# Patient Record
Sex: FEMALE | Race: WHITE | Employment: UNEMPLOYED | URBAN - METROPOLITAN AREA
[De-identification: names, ages, dates, MRNs, and addresses within clinical notes are randomized per-mention and may not be internally consistent; named-entity substitution may affect disease eponyms.]

---

## 2020-05-18 ENCOUNTER — TELEPHONE (OUTPATIENT)
Dept: ENDOCRINOLOGY CLINIC | Facility: CLINIC | Age: 40
End: 2020-05-18

## 2020-05-18 NOTE — TELEPHONE ENCOUNTER
Patient indicates she received a card from [employer] to schedule an appointment regarding her thyroid. Patient is a new consult and asking if she can make an appointment. Please call with  at:[phone],thanks.

## 2020-08-10 ENCOUNTER — OFFICE VISIT (OUTPATIENT)
Dept: ENDOCRINOLOGY CLINIC | Facility: CLINIC | Age: 40
End: 2020-08-10
Payer: COMMERCIAL

## 2020-08-10 VITALS
DIASTOLIC BLOOD PRESSURE: 70 MMHG | HEART RATE: 66 BPM | WEIGHT: 128 LBS | BODY MASS INDEX: 24 KG/M2 | SYSTOLIC BLOOD PRESSURE: 101 MMHG

## 2020-08-10 DIAGNOSIS — E04.2 MULTIPLE THYROID NODULES: Primary | ICD-10-CM

## 2020-08-10 PROCEDURE — 3078F DIAST BP <80 MM HG: CPT | Performed by: INTERNAL MEDICINE

## 2020-08-10 PROCEDURE — 3074F SYST BP LT 130 MM HG: CPT | Performed by: INTERNAL MEDICINE

## 2020-08-10 PROCEDURE — 99203 OFFICE O/P NEW LOW 30 MIN: CPT | Performed by: INTERNAL MEDICINE

## 2020-08-10 NOTE — PROGRESS NOTES
Name: Adam Pardo  Date: 8/10/2020    Referring Physician: No ref. provider found    Patient presents with:  Consult: PCP advised pt to follow up with Endocrinologist due to abnormal thyroid US.        HISTORY OF PRESENT ILLNESS   Delon Liao file    Tobacco Use      Smoking status: Never Smoker      Smokeless tobacco: Never Used      Medical History:   History reviewed. No pertinent past medical history. Surgical history:   History reviewed. No pertinent surgical history.     PHYSICAL EXAMIN

## 2023-12-29 NOTE — PAT NURSING NOTE
Patient expressed some concerns regarding her sedation for her upcoming EGD. Instructed patient to call [facility] office; the office phone number was provided.

## 2024-01-08 ENCOUNTER — ANESTHESIA EVENT (OUTPATIENT)
Dept: ENDOSCOPY | Facility: HOSPITAL | Age: 44
End: 2024-01-08
Payer: COMMERCIAL

## 2024-01-08 ENCOUNTER — ANESTHESIA (OUTPATIENT)
Dept: ENDOSCOPY | Facility: HOSPITAL | Age: 44
End: 2024-01-08
Payer: COMMERCIAL

## 2024-01-08 ENCOUNTER — HOSPITAL ENCOUNTER (OUTPATIENT)
Facility: HOSPITAL | Age: 44
Setting detail: HOSPITAL OUTPATIENT SURGERY
Discharge: HOME | End: 2024-01-08
Attending: INTERNAL MEDICINE | Admitting: INTERNAL MEDICINE
Payer: COMMERCIAL

## 2024-01-08 VITALS
WEIGHT: 125 LBS | RESPIRATION RATE: 15 BRPM | OXYGEN SATURATION: 100 % | SYSTOLIC BLOOD PRESSURE: 111 MMHG | DIASTOLIC BLOOD PRESSURE: 75 MMHG | HEIGHT: 61 IN | HEART RATE: 68 BPM | BODY MASS INDEX: 23.6 KG/M2

## 2024-01-08 DIAGNOSIS — R10.13 EPIGASTRIC PAIN: ICD-10-CM

## 2024-01-08 LAB — B-HCG UR QL: NEGATIVE

## 2024-01-08 PROCEDURE — 0DB68ZX EXCISION OF STOMACH, VIA NATURAL OR ARTIFICIAL OPENING ENDOSCOPIC, DIAGNOSTIC: ICD-10-PCS | Performed by: INTERNAL MEDICINE

## 2024-01-08 PROCEDURE — 0DB98ZX EXCISION OF DUODENUM, VIA NATURAL OR ARTIFICIAL OPENING ENDOSCOPIC, DIAGNOSTIC: ICD-10-PCS | Performed by: INTERNAL MEDICINE

## 2024-01-08 PROCEDURE — 81025 URINE PREGNANCY TEST: CPT

## 2024-01-08 PROCEDURE — 88305 TISSUE EXAM BY PATHOLOGIST: CPT | Performed by: INTERNAL MEDICINE

## 2024-01-08 PROCEDURE — 88312 SPECIAL STAINS GROUP 1: CPT | Performed by: INTERNAL MEDICINE

## 2024-01-08 RX ORDER — SODIUM CHLORIDE, SODIUM LACTATE, POTASSIUM CHLORIDE, CALCIUM CHLORIDE 600; 310; 30; 20 MG/100ML; MG/100ML; MG/100ML; MG/100ML
INJECTION, SOLUTION INTRAVENOUS CONTINUOUS
Status: DISCONTINUED | OUTPATIENT
Start: 2024-01-08 | End: 2024-01-08

## 2024-01-08 RX ORDER — MIDAZOLAM HYDROCHLORIDE 1 MG/ML
INJECTION INTRAMUSCULAR; INTRAVENOUS AS NEEDED
Status: DISCONTINUED | OUTPATIENT
Start: 2024-01-08 | End: 2024-01-08 | Stop reason: SURG

## 2024-01-08 RX ORDER — MIDAZOLAM HYDROCHLORIDE 1 MG/ML
1 INJECTION INTRAMUSCULAR; INTRAVENOUS EVERY 5 MIN PRN
Status: DISCONTINUED | OUTPATIENT
Start: 2024-01-08 | End: 2024-01-08

## 2024-01-08 RX ORDER — DEXAMETHASONE SODIUM PHOSPHATE 4 MG/ML
VIAL (ML) INJECTION AS NEEDED
Status: DISCONTINUED | OUTPATIENT
Start: 2024-01-08 | End: 2024-01-08 | Stop reason: SURG

## 2024-01-08 RX ORDER — NALOXONE HYDROCHLORIDE 0.4 MG/ML
0.08 INJECTION, SOLUTION INTRAMUSCULAR; INTRAVENOUS; SUBCUTANEOUS ONCE AS NEEDED
Status: DISCONTINUED | OUTPATIENT
Start: 2024-01-08 | End: 2024-01-08

## 2024-01-08 RX ORDER — LIDOCAINE HYDROCHLORIDE 10 MG/ML
INJECTION, SOLUTION EPIDURAL; INFILTRATION; INTRACAUDAL; PERINEURAL AS NEEDED
Status: DISCONTINUED | OUTPATIENT
Start: 2024-01-08 | End: 2024-01-08 | Stop reason: SURG

## 2024-01-08 RX ORDER — SODIUM CHLORIDE 0.9 % (FLUSH) 0.9 %
10 SYRINGE (ML) INJECTION AS NEEDED
Status: DISCONTINUED | OUTPATIENT
Start: 2024-01-08 | End: 2024-01-08

## 2024-01-08 RX ORDER — ONDANSETRON 2 MG/ML
INJECTION INTRAMUSCULAR; INTRAVENOUS AS NEEDED
Status: DISCONTINUED | OUTPATIENT
Start: 2024-01-08 | End: 2024-01-08 | Stop reason: SURG

## 2024-01-08 RX ORDER — METOCLOPRAMIDE HYDROCHLORIDE 5 MG/ML
INJECTION INTRAMUSCULAR; INTRAVENOUS AS NEEDED
Status: DISCONTINUED | OUTPATIENT
Start: 2024-01-08 | End: 2024-01-08 | Stop reason: SURG

## 2024-01-08 RX ADMIN — SODIUM CHLORIDE, SODIUM LACTATE, POTASSIUM CHLORIDE, CALCIUM CHLORIDE: 600; 310; 30; 20 INJECTION, SOLUTION INTRAVENOUS at 10:37:00

## 2024-01-08 RX ADMIN — MIDAZOLAM HYDROCHLORIDE 2 MG: 1 INJECTION INTRAMUSCULAR; INTRAVENOUS at 10:22:00

## 2024-01-08 RX ADMIN — METOCLOPRAMIDE HYDROCHLORIDE 20 MG: 5 INJECTION INTRAMUSCULAR; INTRAVENOUS at 10:22:00

## 2024-01-08 RX ADMIN — LIDOCAINE HYDROCHLORIDE 50 MG: 10 INJECTION, SOLUTION EPIDURAL; INFILTRATION; INTRACAUDAL; PERINEURAL at 10:24:00

## 2024-01-08 RX ADMIN — ONDANSETRON 4 MG: 2 INJECTION INTRAMUSCULAR; INTRAVENOUS at 10:22:00

## 2024-01-08 RX ADMIN — SODIUM CHLORIDE, SODIUM LACTATE, POTASSIUM CHLORIDE, CALCIUM CHLORIDE: 600; 310; 30; 20 INJECTION, SOLUTION INTRAVENOUS at 10:22:00

## 2024-01-08 RX ADMIN — DEXAMETHASONE SODIUM PHOSPHATE 4 MG: 4 MG/ML VIAL (ML) INJECTION at 10:29:00

## 2024-01-08 RX ADMIN — DEXAMETHASONE SODIUM PHOSPHATE 4 MG: 4 MG/ML VIAL (ML) INJECTION at 10:25:00

## 2024-01-08 RX ADMIN — LIDOCAINE HYDROCHLORIDE 50 MG: 10 INJECTION, SOLUTION EPIDURAL; INFILTRATION; INTRACAUDAL; PERINEURAL at 10:23:00

## 2024-01-08 NOTE — ANESTHESIA PREPROCEDURE EVALUATION
Anesthesia PreOp Note    HPI:     Eleni Linder is a 43 year old female who presents for preoperative consultation requested by: Jessi Dukes MD    Date of Surgery: 2024    Procedure(s):  ESOPHAGOGASTRODUODENOSCOPY  Indication: Epigastric pain    Relevant Problems   No relevant active problems       NPO:  Last Liquid Consumption Date: 24  Last Liquid Consumption Time: 2330  Last Solid Consumption Date: 24  Last Solid Consumption Time: 1900  Last Liquid Consumption Date: 24          History Review:  Patient Active Problem List    Diagnosis Date Noted    Multiple thyroid nodules 08/10/2020       Past Medical History:   Diagnosis Date    History of 2019 novel coronavirus disease (COVID-19)     20 - not hospitalized     Vitamin D deficiency        Past Surgical History:   Procedure Laterality Date             Medications Prior to Admission   Medication Sig Dispense Refill Last Dose    Cholecalciferol 50 MCG (2000) Oral Tab Take 2,000 Units by mouth daily. (Patient not taking: Reported on 2021 )       Pantoprazole Sodium 40 MG Oral Tab EC Take 1 tablet (40 mg total) by mouth daily. Before meal (Patient not taking: Reported on 8/10/2020 ) 60 tablet 0      Current Facility-Administered Medications Ordered in Epic   Medication Dose Route Frequency Provider Last Rate Last Admin    sodium chloride 0.9% 0.9% flush injection 10 mL  10 mL Intravenous PRN Jessi Dukes MD        lactated ringers infusion   Intravenous Continuous Jessi Dukes MD        midazolam (Versed) 2 MG/2ML injection 1 mg  1 mg Intravenous Q5 Min PRN Jessi Dukes MD        fentaNYL (Sublimaze) 50 mcg/mL injection 25 mcg  25 mcg Intravenous Q5 Min PRN Jessi Dukes MD        lactated ringers infusion   Intravenous Continuous Jessi Dukes MD        naloxone (Narcan) 0.4 MG/ML injection 0.08 mg  0.08 mg Intravenous Once PRN Jessi Dukes MD         No current [facility]-ordered outpatient medications on file.        No Known Allergies    Family History   Family history unknown: Yes     Social History     Socioeconomic History    Marital status: Unknown   Tobacco Use    Smoking status: Never    Smokeless tobacco: Never   Vaping Use    Vaping Use: Never used   Substance and Sexual Activity    Alcohol use: Not Currently    Drug use: Not Currently       Available pre-op labs reviewed.  Lab Results   Component Value Date    URINEPREG Negative 01/08/2024             Vital Signs:  Body mass index is 23.62 kg/m².   height is 1.549 m (5' 1\") and weight is 56.7 kg (125 lb). Her blood pressure is 112/69 and her pulse is 69. Her respiration is 14 and oxygen saturation is 100%.   Vitals:    12/29/23 1606 01/08/24 0929   BP:  112/69   Pulse:  69   Resp:  14   SpO2:  100%   Weight: 56.7 kg (125 lb)    Height: 1.549 m (5' 1\")         Anesthesia Evaluation     Patient summary reviewed and Nursing notes reviewed    Airway   Mallampati: I  TM distance: >3 FB  Neck ROM: full  Dental      Pulmonary - negative ROS    breath sounds clear to auscultation  Cardiovascular - negative ROS  Exercise tolerance: good    Rhythm: regular  Rate: normal    Neuro/Psych - negative ROS     GI/Hepatic/Renal      Comments: Epigastric pain    Endo/Other - negative ROS   Abdominal      Other findings: METS > 4            Anesthesia Plan:   ASA:  1  Plan:   MAC  Informed Consent Plan and Risks Discussed With:  Patient      I have informed Elenibharathi Linder and/or legal guardian or family member of the nature of the anesthetic plan, benefits, risks including possible dental damage if relevant, major complications, and any alternative forms of anesthetic management.   All of the patient's questions were answered to the best of my ability. The patient desires the anesthetic management as planned.  Aris Morales CRNA  1/8/2024 10:16 AM  Present on Admission:  **None**

## 2024-01-08 NOTE — ANESTHESIA POSTPROCEDURE EVALUATION
Patient: Elein Linder    Procedure Summary       Date: 01/08/24 Room / Location: [facility] ENDOSCOPY 05 / EM ENDOSCOPY    Anesthesia Start: 1022 Anesthesia Stop:     Procedure: ESOPHAGOGASTRODUODENOSCOPY Diagnosis:       Epigastric pain      (gastric polyp, rule out gastritis, rule out celiac disease)    Surgeons: Jessi Dukes MD Anesthesiologist: Aris Morales CRNA    Anesthesia Type: MAC ASA Status: 1            Anesthesia Type: MAC    Vitals Value Taken Time   /67 01/08/24 1042   Temp 97.0 01/08/24 1042   Pulse 77 01/08/24 1042   Resp 14 01/08/24 1042   SpO2 99 01/08/24 1042       [facility] AN Post Evaluation:   Patient Evaluated in PACU  Patient Participation: complete - patient participated  Level of Consciousness: awake and alert  Pain Score: 0  Pain Management: adequate  Airway Patency:patent  Yes    Cardiovascular Status: acceptable  Respiratory Status: acceptable  Postoperative Hydration acceptable      Aris Morales CRNA  1/8/2024 10:42 AM

## 2024-01-08 NOTE — DISCHARGE INSTRUCTIONS
POST-UPPER ENDOSCOPY (EGD) DISCHARGE INSTRUCTIONS      PROCEDURE PERFORMED: EGD with biopsies    ENDOSCOPIST: Jessi Dukes MD    FINDINGS: Gastritis (inflammation of the stomach lining) and Gastric polyps    MEDICATIONS: You may resume all other medications today    DIET: You may resume your regular diet today.    BIOPSIES: No biopsies were taken    X-RAYS: No X-rays/Labs were ordered today        Activity for remainder of today:  REST TODAY  DO NOT drive or operate heavy machinery  DO NOT drink any alcoholic beverages  DO NOT sign any legal documents or make any important decisions    After your procedure(s):  It is not unusual to feel bloated or gassy .  Passing gas and belching is encouraged. Lying on your left side with your knees flexed may relieve the discomfort. A hot pack to the abdomen may also help. If you had an upper endoscopy (EGD), you may experience a slight sore throat which will subside. Throat lozenges or salt water gargle can be used.    FOLLOW-UP:  Contact the office at [phone] if a follow-up appointment is needed or if you develop any of the following:    Severe abdominal pain/discomfort   Excessive bleeding  Black tarry stool  Difficulty breathing/swallowing  Persistent nausea/vomiting    Fever above 100 degrees or chills  Home Care Instructions for Gastroscopy with Sedation    Diet:  - Resume your regular diet as tolerated unless otherwise instructed.  - Start with light meals to minimize bloating.  - Do not drink alcohol today.    Medication:  - If you have questions about resuming your normal medications, please contact your Primary Care Physician.    Activities:  - Take it easy today. Do not return to work today.  - Do not drive today.  - Do not operate any machinery today (including kitchen equipment).    Gastroscopy:  - You may have a sore throat for 2-3 days following the exam. This is normal. Gargling with warm salt water (1/2 tsp salt to 1 glass warm water) or using throat lozenges  will help.  - If you experience any sharp pain in your neck, abdomen or chest, vomiting of blood, oral temperature over 100 degrees Fahrenheit, light-headedness or dizziness, or any other problems, contact your doctor.    **If unable to reach your doctor, please go to the [facility] Room**    - Your referring physician will receive a full report of your examination.  - If you do not hear from your doctor's office within two weeks of your biopsy, please call them for your results.    You may be able to see your laboratory results in [employer] between 4 and 7 business days.  In some cases, your physician may not have viewed the results before they are released to [employer].  If you have questions regarding your results contact the physician who ordered the test/exam by phone or via [employer] by choosing \"Ask a Medical Question.\"

## 2024-01-08 NOTE — H&P
HISTORY AND PHYSICAL FOR ENDOSCOPIC PROCEDURES      Eleni Linder Patient Status:  Hospital Outpatient Surgery    1980 MRN [id number]   Location [facility] ENDOSCOPY LAB SUITES Attending Jessi Dukes MD   Hosp Day # 0 PCP None Pcp     Date of Initial Consult:  10/25/23  Reason for Consultation:  abdominal pain    Scheduled Procedure: EGD  Indications for Procedure: Abdominal pain    History of Present Illness:  Eleni Linder is a/a(n) 43 year old female who presented with complaints as stated above.    Medical History:  Past Medical History:   Diagnosis Date    History of 2019 novel coronavirus disease (COVID-19)     20 - not hospitalized     Vitamin D deficiency      Pt  Past Surgical History:   Procedure Laterality Date            reports that she has never smoked. She has never used smokeless tobacco. She reports that she does not currently use alcohol. She reports that she does not currently use drugs.  Family History   Family history unknown: Yes       Allergies:  No Known Allergies    Medications:    Current Facility-Administered Medications:     sodium chloride 0.9% 0.9% flush injection 10 mL, 10 mL, Intravenous, PRN    lactated ringers infusion, , Intravenous, Continuous    midazolam (Versed) 2 MG/2ML injection 1 mg, 1 mg, Intravenous, Q5 Min PRN    fentaNYL (Sublimaze) 50 mcg/mL injection 25 mcg, 25 mcg, Intravenous, Q5 Min PRN    lactated ringers infusion, , Intravenous, Continuous    naloxone (Narcan) 0.4 MG/ML injection 0.08 mg, 0.08 mg, Intravenous, Once PRN    Anticoagulants: None    History of Anesthesia Complications: None    Review of Systems:  General: Negative other than what was specified in the HPI  Respiratory: Negative other than what was specified in the HPI  Cardiovascular: Negative other than what was specified in the HPI  Gastrointestinal: Negative other than what was specified in the HPI  Neurological: Negative other than what was specified  in the HPI    Physical Exam:  General: AAOx3 in NAD  HEENT: No scleral icterus, no LAD  Lungs: CTA bilaterally, no wheezing or crackles  CV: RRR S1S2, no murmurs or rubs  Abdomen: Normal active bowel sounds, soft, nontender, nondistended, no rebound or guarding  Extremities: No edema or discoloration    Assessment and Plan:  Patient Active Problem List   Diagnosis    Multiple thyroid nodules         A. ASA Classification: 2. Patient with mild systemic disease    B.  The sedation plan was explained to the patient.  The risks, benefits and alternatives to the aforementioned endoscopic evaluation(s), including but not limited to: infection, bleeding, aspiration, perforation, adverse medications reaction, missed diagnosis and missed lesions, were explained to the patient and/or family and/or POA and they voiced their understanding and agreed to undergo the procedure(s). They were given the opportunity to ask questions and all inquiries were addressed.    C.  Sedation Plan: Monitored Anesthesia Care      Jessi Dukes MD  1/8/2024  9:27 AM

## 2024-01-08 NOTE — OPERATIVE REPORT
EGD Operative Report    Eleni Linder Patient Status:  Hospital Outpatient Surgery    1980 MRN [id number]   Location [facility] ENDOSCOPY LAB SUITES Attending Jessi Dukes MD    Day #   0 PCP None Pcp       Pre-op Diagnosis: Epigastric pain    Post-Op Diagnosis: Gastritis and Gastric polyps    Pre-procedure: The patient was assessed in the procedure room immediately before induction of sedation which included, at a minimum, vital signs, NPO status, and airway assessment.  The patient was deemed and appropriate candidate for procedural sedation.    Informed Consent: Informed consent for both the procedure and sedation were obtained from the patient.  The risks, benefits and alternatives to the procedure including potentially life-threatening complications of sedation, bleeding, perforation, missed lesions or need for repeat endoscopy were reviewed along with the possible need for hospitalization, surgical management, transfusion of blood products or repeat endoscopy should one of these complications arise.  The patient and/or POA voiced their understanding and was agreeable to proceed.     Sedation Type: MAC-Patient received sedation with monitored anesthesia provided by an anesthesiologist    Procedure Description: The patient was placed in the left lateral decubitus position.  A bite block was placed in the patient's mouth and the endoscope was passed through the mouth under direct vision and advanced to the second portion of the duodenum.  The endoscope was then withdrawn to examine the duodenal bulb, pylorus and gastric antrum, body, incisura and fundus and then retroflexed to  to examine the GE junction and cardia. The upper endoscopy was performed without difficulty, the patient tolerated the procedure well with no immediate complications.  The patient was transferred to the recovery area in stable condition.   Findings: Esophagus was grossly normal and GE junction did not have any evidence of inflammation.  Few benign-appearing gastric polyps were noted in the gastric fundus and biopsies were taken of this region as well as mild erythema in the gastric antrum.  Duodenal bulb had few localized patchy areas of denuded mucosa on biopsies were taken to evaluate for celiac disease.  Remainder of the duodenum, including the second portion, was unremarkable.  Impression: Gastritis (inflammation of the stomach lining) and Gastric polyps  Recommendations: Discharge patient when discharge criteria are met., Await pathology results, Further recommendations will be made after pathology results are available.  Discharge:  The patient was given an after visit summary detailing the procedure, findings, recommendations and follow up plans.    Jessi Dukes MD  1/8/2024  10:32 AM

## 2024-07-06 RX ORDER — FERROUS SULFATE 325(65) MG
325 TABLET ORAL EVERY OTHER DAY
COMMUNITY
Start: 2023-12-21 | End: 2025-04-21 | Stop reason: ALTCHOICE

## 2024-07-06 RX ORDER — OLOPATADINE HYDROCHLORIDE 1 MG/ML
1 SOLUTION/ DROPS OPHTHALMIC 2 TIMES DAILY
COMMUNITY
Start: 2022-02-06 | End: 2024-07-06 | Stop reason: ALTCHOICE

## 2025-03-18 NOTE — DISCHARGE INSTRUCTIONS
Después del procedimiento  Si recibió algún tipo de sedación, anestesia general o anestesia tejeda, debe pedir a alguien que lo lleve a casa. Meredith vez que esté en casa:   Birdie abundante cantidad de líquidos.  Es posible que sienta ardor en la uretra al orinar o que tenga un poco de ora en la orina. [place] es normal.  Jay vez le receten medicamentos para aliviar el dolor leve o prevenir infecciones. [place] los medicamentos siguiendo las indicaciones que le hayan dado.  Cuándo debe llamar al proveedor de atención médica  Llame a herring proveedor de atención médica si presenta cualquiera de estos síntomas después del procedimiento:   Sangrado intenso o coágulos de ora  Ardor que dura más de 1 día  Fiebre de  100.4 ° F ( 38°C ) o superior, o según le indique el proveedor  Problemas para hacer pis     CIRUGIA AMBULATORIA: INSTRUCCIONES DESPUES DE JERAD RECIBIDO ANESTESIA  Debido a la Anestesia y a las medicamentos que se le aplicaron vane la cirugia, richard reflejos y capacidaddiscernimiento pueden verse afectados. Tambien podria tener un poco de mareo.Aparte de siguir las precauciones de sentico comun, le recomendamos lo siguiente:     El paciente debe estar acompañado por alguien hasta la mañana siguiente.     No maneje ningun vehiculo automotor ni monte bicicleta.     No tome ninguna decision importante lokesh por ejemplo firmar de documentos importantes.     No opere herramientas electricas ni electrodomesticos, tales lokesh cuchillos electricos, batidoras electricas o serruchos electricos. No palma el cesped con cortadoras electricas. No practique deportes.     No óscar ejercicio.     Para evitar las nauseas, coma menos de lo normal mas o menos la mitad de lo habitual y/o birdie solo liquidos hasta la manana siguiente. Consulte con herring doctor si esta llevando meredith dieta especial.     No tome bebidas alcoholicas, tranquilizantes, pastilles para dormir etc., y verifique con herring doctor acerca de cualquier medicamento que reji  tomando actualmente.     El efecto de la medicación usada en herring anestesia habra pasado chai por completo a la medianoche. Por lo tanto, puede reanudar richard habitos cotidianos en la mañana.     Los adultos deben descansar lo jeb posible por las siguientes 24 horas. Los niños deben permanecer en cama lo jeb posible por las siguientes 24 horas.     Si se presenta cualquier problema, puede llamar a herring propio doctor personal o aceda al centro de [facility].    Si sigue estas instrucciones, se sentira major y estara mas seguro despues de herring cirugia ambulatoria. Sitiene cualquier pregunta, llame al hospital y pida que lo comuniquen con la enfermera de cirugia [phone], extension [phone].    Instrucciones para el jen: después de herring cirugía   Acaba de someterse a meredith cirugía. Vane la cirugía, le administraron un tipo de medicamento llamado anestesia para que esté relajado y no sienta dolor. Después de la cirugía, elisa vez sienta algo de dolor o náuseas. [place] es común. Estos son algunos consejos para sentirse mejor y recuperarse sweta después de la cirugía.   El regreso a casa  Herring proveedor de atención médica le enseñará cómo cuidarse cuando regrese a herring casa. También responderá richard preguntas. Pida a un familiar o amigo adulto que lo conduzca a herring casa. Vane las primeras 24 horas después de la cirugía, siga estas recomendaciones:   No conduzca ni use maquinaria pesada.  No tome decisiones importantes ni firme ningún documento legal.  Adminístrese los medicamentos según las indicaciones.  Evite el consumo de alcohol.  Si es necesario, coordine para que alguien se quede con usted. Esta persona puede vigilar cualquier problema que se presente y lo ayudará a permanecer seguro.  Asegúrese de asistir a todas richard visitas de control con herring proveedor de atención médica. Y descanse después de la cirugía vane el tiempo que le indique herring proveedor.   Cómo sobrellevar el dolor  Si  siente dolor después de la cirugía, los analgésicos lo ayudarán a sentirse mejor. [place] los analgésicos según las indicaciones, antes de que el dolor se intensifique. Además, pregunte a herring proveedor de atención médica o al farmacéutico acerca de otras formas de controlar el dolor. Estas podrían incluir aplicar calor o hielo, o hacer ejercicios de relajación. Y siga todas las instrucciones que le dé herring cirujano o enfermero.      Cumpla el cronograma de richard medicamentos.     Consejos para chantell analgésicos  Para aliviar el dolor lo mila posible, recuerde estos puntos:   Los analgésicos pueden causar malestar estomacal. Tomarlos con un poco de comida puede aliviar lisa efecto.  La mayoría de los calmantes que se lópez por la boca necesitan por lo menos de 20 a 30 minutos para surtir efecto.  No espere hasta que herring dolor se vuelva intenso para chantell el analgésico que le indicaron. Intente que el momento en que puede chantell herring medicamento coincida con otra actividad. Lisa podría ser el momento antes de vestirse, christian un paseo o sentarse a la hadley para cenar.  El estreñimiento es un efecto secundario frecuente de algunos analgésicos. Consulte a herring proveedor de atención médica antes de usar cualquier medicamento, lokesh laxantes o ablandadores de heces, para ayudar a aliviar el estreñimiento. También consulte si es preciso evitar algún tipo de alimento. Chantell mucha cantidad de líquido y comer alimentos lokesh frutas y verduras con alto contenido de fibra también puede ser beneficioso. Recuerde que no debe chantell laxantes a menos que herring cirujano se los indique.  Mezclar bebidas alcohólicas y analgésicos puede causar mareos y enlentecer herring respiración. Y hasta puede ser mortal. No gagandeep alcohol mientras esté tomando calmantes.  Los analgésicos pueden hacer que tenga reacciones más lentas. No conduzca ni opere maquinaria mientras esté tomando analgésicos.  Herring proveedor de atención médica puede indicarle que tome acetaminofén  (paracetamol) para ayudar a aliviar el dolor. Pregúntele qué cantidad debe armand por día. El acetaminofén y otros analgésicos pueden interactuar con richard medicamentos recetados u otros medicamentos de venta sailaja (OTC, por richard siglas en inglés). Algunos medicamentos recetados contienen acetaminofén y otros ingredientes. Combinar medicamentos recetados y acetaminofén de venta sailaja para aliviar el dolor puede provocarle meredith sobredosis accidental. Mylene atentamente la etiqueta del envase de richard medicamentos OTC. [place] lo ayudará a saber con exactitud la lista de ingredientes, la cantidad que debe armand y cualquier advertencia. [place] también puede ayudarlo a evitar armand demasiado acetaminofén. Si tiene preguntas o no entiende la información, pídale a herring farmacéutico o proveedor de atención médica que se la explique antes de armand el medicamento OTC.   Manejo de las náuseas  Algunas personas pueden sentir malestar estomacal (náuseas) después de la cirugía. [place] suele suceder debido a la anestesia, el dolor, los analgésicos, la disminución del movimiento de la comida en el estómago o el estrés de la cirugía. Estos consejos lo ayudarán a manejar las náuseas y a comer alimentos más saludables mientras se recupera. Si seguía un plan alimentario especial antes de la cirugía, pregúntele a herring proveedor de atención médica si debe continuarlo mientras se recupera. Consulte con herring proveedor cómo debería continuar herring alimentación. Esta puede variar según el tipo de cirugía a la que se sometió. Los siguientes consejos generales pueden serle útiles:   No se fuerce a comer. Guíese por herring cuerpo para saber cuándo comer y qué cantidad.  Comience con líquidos transparentes y sopa. Estos son más fáciles de digerir.  Tan pronto lokesh se sienta listo, intente comer alimentos semisólidos. Estos incluyen puré de ann marie, puré de manzana y gelatina.  Lentamente, pase a alimentos sólidos. Al principio no coma alimentos grasosos, pesados ni  condimentados.  No se fuerce a hacer balaji comidas grandes al día. En cambio, coma cantidades pequeñas, semaj con mayor frecuencia.  [place] los analgésicos con meredith pequeña cantidad de alimentos sólidos, lokesh galletas saladas o meredith tostada. [place] ayuda a prevenir las náuseas.  Cuándo llamar a herring proveedor de atención médica   Llame de inmediato a herring proveedor de atención médica si nota alguno de los siguientes síntomas:   Sigue teniendo mucho dolor, o el dolor empeora, después de armand el medicamento. Puede que el medicamento no sea lo suficientemente tawanda. O sweta, puede porfirio complicaciones de la cirugía.  Se siente demasiado somnoliento, mareado o adormecido. Quizás el medicamento sea demasiado tawanda.  Tiene efectos secundarios, lokesh náuseas o vómitos. Herring proveedor de atención médica puede recomendarle armand otros medicamentos.  Tiene cambios en la piel, lokesh sarpullido, picazón o urticaria. [place] puede significar que tiene meredith reacción alérgica. Herring proveedor puede recomendarle armand otros medicamentos.  La incisión tiene un aspecto diferente (por ejemplo, se abre meredith parte).  Tiene sangrado o supuración de líquido de la herida y no le dijeron que eso era esperable.  Fiebre de 100.4 °F (38 °C) o más, o según le indique herring proveedor.  Cuándo llamar al 911  Llame al  911  de inmediato si tiene:   Dificultad para respirar  [place] hinchada    Si tiene apnea del sueño obstructiva   Dick la cirugía, le administraron anestesia para que esté cómodo y no sienta dolor. Después de la cirugía, es probable que tenga más ataques de apnea causados por la anestesia y otros medicamentos que le administraron. Los ataques pueden durar más de lo habitual.    En herring casa, óscar lo siguiente:  Cuando duerma, siga usando herring dispositivo de presión positiva continua en las vías respiratorias (CPAP, por richard siglas en inglés). A menos que herring proveedor de atención médica le indique lo contrario, úselo siempre que duerma, ya sea de día o de noche.  El dispositivo de CPAP suele usarse para tratar la apnea obstructiva del sueño.  Consulte a herring proveedor antes de armand cualquier analgésico, relajante muscular o sedante. Herring proveedor le dará información sobre los peligros de armand estos medicamentos.  Comuníquese con herring proveedor si tiene el sueño demasiado alterado, incluso cuando esté tomando los medicamentos según las instrucciones.  © 1157-1530 [employer]. Todos los derechos reservados. Esta información no pretende sustituir la atención médica profesional. Sólo herring médico puede diagnosticar y tratar un problema de kaylee.

## 2025-04-21 ENCOUNTER — HOSPITAL ENCOUNTER (OUTPATIENT)
Facility: HOSPITAL | Age: 45
Setting detail: HOSPITAL OUTPATIENT SURGERY
Discharge: HOME | End: 2025-04-21
Attending: STUDENT IN AN ORGANIZED HEALTH CARE EDUCATION/TRAINING PROGRAM | Admitting: STUDENT IN AN ORGANIZED HEALTH CARE EDUCATION/TRAINING PROGRAM
Payer: COMMERCIAL

## 2025-04-21 VITALS
HEART RATE: 60 BPM | DIASTOLIC BLOOD PRESSURE: 63 MMHG | BODY MASS INDEX: 24.35 KG/M2 | RESPIRATION RATE: 15 BRPM | OXYGEN SATURATION: 100 % | TEMPERATURE: 98 F | WEIGHT: 129 LBS | HEIGHT: 61 IN | SYSTOLIC BLOOD PRESSURE: 121 MMHG

## 2025-04-21 LAB — B-HCG UR QL: NEGATIVE

## 2025-04-21 PROCEDURE — 81025 URINE PREGNANCY TEST: CPT

## 2025-04-21 DEVICE — BULKAMID URETHRAL BULKING SYSTEM 2ML
Type: IMPLANTABLE DEVICE | Site: URETHRA | Status: FUNCTIONAL
Brand: BULKAMID

## 2025-04-21 RX ORDER — LIDOCAINE HYDROCHLORIDE 20 MG/ML
JELLY TOPICAL AS NEEDED
Status: DISCONTINUED | OUTPATIENT
Start: 2025-04-21 | End: 2025-04-21

## 2025-04-21 RX ORDER — LIDOCAINE HYDROCHLORIDE 10 MG/ML
INJECTION, SOLUTION EPIDURAL; INFILTRATION; INTRACAUDAL; PERINEURAL AS NEEDED
Status: DISCONTINUED | OUTPATIENT
Start: 2025-04-21 | End: 2025-04-21

## 2025-04-21 RX ORDER — CEPHALEXIN 500 MG/1
500 CAPSULE ORAL 3 TIMES DAILY
Status: DISCONTINUED | OUTPATIENT
Start: 2025-04-21 | End: 2025-04-21 | Stop reason: HOSPADM

## 2025-04-21 NOTE — H&P
45yo F with MEGHAN, nocturia 2-3x. On oxybuytnin     Has tried and failed kegels for MEGHAN for >1 years as well as behavioral modifications. Has done PFPT as well.     No rUTIs           History/Other:             Current Outpatient Medications   Medication Sig Dispense Refill    Trospium Chloride 20 MG Oral Tab Take 1 tablet (20 mg total) by mouth 2 (two) times daily. 60 tablet 5           Past Medical History:   Diagnosis Date    History of 2019 novel coronavirus disease (COVID-19)       20 - not hospitalized     Vitamin D deficiency                Past Surgical History:   Procedure Laterality Date                  Family History   Family history unknown: Yes         REVIEW OF SYSTEMS:     A 10-point comprehensive review of systems was negative with pertinent items noted in HPI.      Objective:      GENERAL: well developed, well nourished, in no apparent distress  HEENT: atraumatic, normocephalic  LUNGS: normal respiratory motion without distress  CARDIO:NA  Abd: Soft, non-tender, non-distended  : Normal external genitalia with no lesions or discharge: yes; Vaginal Atrophy no  none  Pelvic Floor TTP: none  MEGHAN: +  Masses Appreciated: no  NEURO: Alert   EXTREMITIES: Edema no     Cystometrics: no                 Lab Results   Component Value Date     GLUCOSEDIP Negative 2024     BILIRUBIN Negative 2024     KETONESDIP Negative 2024     SPECGRAVITY 1.025 2024     BLOODU Negative 2024     PHURINE 6.0 2024     PROTEINDIP Negative 2024     UROBILIN <=1.0 2024     NITRITE Negative 2024     LEUKOCYTES Negative 2024            PVR: 24ml     Assessment & Plan:      45yo F  Diagnoses and all orders for this visit:     MEGHAN (stress urinary incontinence, female)  -     MEASUREMENT, POST-VOIDING RESIDUAL URINE &/OR BLADDER CAPACITY, US, NON-IMAGING  -     PELVIC EXAMINATION     Intrinsic sphincter deficiency (ISD)  -     DULY SURGERY SCHEDULING UROLOGY;  Future     Nocturia     Urgency-frequency syndrome     Other orders  -     Trospium Chloride 20 MG Oral Tab; Take 1 tablet (20 mg total) by mouth 2 (two) times daily.      Plan:  -Stop oxybutynin given risks of dementia, should not be on this medication. Start Trospium 20mg BID instead. It is cheap through [employer]  -For Meghan:     All options of MEGHAN treatment discussed including physical therapy, pessary, MUS, fascial sling and bulkamid. Success rates, risks and benefits of each procedure discussed at length.    Has tried and failed kegels for MEGHAN for >1 years as well as behavioral modifications     Will schedule for Bulkamid. Not sure if insurance will cover- Access Afshin Ricardo,

## 2025-04-21 NOTE — OPERATIVE REPORT
[facility]  part of [facility]    Operative Note         Eleni Linder Location: OR   [facility] [id number] MRN [id number]   Admission Date 4/21/2025 Operation Date 4/21/2025   Attending Physician Zoë Ricardo DO       Patient Name: Eleni Linder     Preoperative Diagnosis: Intrinsic sphincter deficiency     Postoperative Diagnosis: Intrinsic sphincter deficiency     Procedure(s):  Cystoscopy, urethral bulkamid injection     Primary Surgeon: Zoë Ricardo DO           Anesthesia: Local     Specimen: none     Estimated Blood Loss: minimal    Complications: none      Operative Summary:      45-year-old female with intrinsic sphincter deficiency proven to have stress urinary incontinence on exam in the office.  She has had this for greater than 1 year and has failed Kegel exercises during this time.  She was given all the treatment options and has decided to undergo Bulkamid injection.  She was explained the risks, benefits and alternatives of the procedure and signed consent was placed in the chart.    Patient was positioned on the table in the lithotomy position and was prepped and draped in a usual sterile fashion. Anesthetic gel was administered inside the urethra, and the bladder was emptied using a disposable catheter. 10 ml lidocaine 1% was used to perform a paraurethral block bilaterally at the 3 and 9 o'clock positions approximately 5-10 minutes prior to the procedure.     At the start of the procedure, anesthetic gel was added to the end rotatable Bulkamid sheath and the water inflow was adjusted to produce an adequate stream. The Bulkamid Needle was then placed æ of the way into the needle channel of the rotatable sheath and the entire Bulkamid system was then advanced into the urethra until the bladder is visualised and inspected. The Bulkamid needle was then advanced into the needle channel on the rotatable sheath until the tip of the sheath is adjacent to the bladder  neck.     The sheath was then rotated to the 7 o'clock position. The needed was then extend into the bladder until the 2cm mickey on the needle is visible. The Bulkamid system was then retracted until the tip of the needle was resting on the bladder neck. The needle was then retracted into the sheath and approximately 1.5cm from the bladder neck the Bulkamid system was pressed parallel against the urethral wall and the needle is then advanced into the submucosal tissue ensuring that the bevel of the needle was facing towards the lumen. The needle was advanced approximately 0.5cm, and the Bulkamid hydrogel was then injected until the Bulkamid cushion was visible and reached the midline of the urethral lumen.     The needle was then retracted back into the rotatable sheath and rotated to the next injection site. Subsequent injections were performed at 5 o'clock, 2 o'clock and 10 o'clock all along the same plane as the original injection until all (4) cushions met at the midline of the urethral lumen. 2mls Total of Bulkamid Hydrogel was used.   Approximately 500cc of fluid was left in the bladder and upon completion, the patient emptied her bladder without issues. The procedure was well tolerated, and EBL was minimal.     Patient was given instruction to contact the office if she had any difficulty urinating and was consulted about the potential of a “top up” procedure if the desired effect was not achieved. A single dose of Prophylactic antibiotics was given, and a follow up appointment was scheduled to evaluate improvement in 1 month.        Implants: * No implants in log *     Drains: none     Condition: stable to PACU       Zoë Ricardo DO

## (undated) DEVICE — MEDI-VAC NON-CONDUCTIVE SUCTION TUBING 6MM X 1.8M (6FT.) L: Brand: CARDINAL HEALTH

## (undated) DEVICE — KIT CLEAN ENDOKIT 1.1OZ GOWNX2

## (undated) DEVICE — GLOVE SUR 6.5 SENSICARE PI PIP CRM PWD F

## (undated) DEVICE — SOAP BETASEPT 32 OZ 946 ML

## (undated) DEVICE — CONMED SCOPE SAVER BITE BLOCK, 20X27 MM: Brand: SCOPE SAVER

## (undated) DEVICE — OUTPATIENT: Brand: MEDLINE INDUSTRIES, INC.

## (undated) DEVICE — GIJAW SINGLE-USE BIOPSY FORCEPS WITH NEEDLE: Brand: GIJAW

## (undated) DEVICE — GLOVE SUR 7 SENSICARE PI PIP GRN PWD F

## (undated) DEVICE — KIT ENDO ORCAPOD 160/180/190

## (undated) DEVICE — MEDI-VAC NON-CONDUCTIVE SUCTION TUBING: Brand: CARDINAL HEALTH

## (undated) DEVICE — SOLUTION IV 1000ML 0.9% NACL PRESERVATIVE

## (undated) DEVICE — SKIN PREP TRAY 4 COMPARTM TRAY: Brand: MEDLINE INDUSTRIES, INC.

## (undated) DEVICE — Device: Brand: DUAL NARE NASAL CANNULAE FEMALE LUER CON 7FT O2 TUBE

## (undated) DEVICE — Device: Brand: JELCO

## (undated) DEVICE — STANDARD HYPODERMIC NEEDLE,POLYPROPYLENE HUB: Brand: MONOJECT

## (undated) DEVICE — YANKAUER,BULB TIP,W/O VENT,RIGID,STERILE: Brand: MEDLINE

## (undated) DEVICE — DRAPE,LITHOTOMY,STERILE: Brand: MEDLINE

## (undated) DEVICE — 60 ML SYRINGE REGULAR TIP: Brand: MONOJECT

## (undated) DEVICE — TUR Y CYSTO TUBING SET IRRIG L96IN

## (undated) NOTE — LETTER
Patient Name: Eleni Cano Linder : 1980  Medical Record #: [id number] Printed: 2024  Page 1 of 2                                          [facility]  [address], [place], [place]  Autorización para operación y procedimiento quirúrgico                                                                                                      Por la presente, autorizo a Jessi Dukes MD, mi médico y al asistente a realizar la operación/procedimiento quirúrgico a continuación, así lokesh a administrar la anestesia que determine necesaria mi médico Nombre (s) de la operación/procedimiento: ESOPHAGOGASTRODUODENOSCOPY en Eleni Cano Linder     Reconozco que vane la operación/procedimiento quirúrgico, las condiciones imprevistas pueden requerir de procedimientos adicionales o diferentes a aquellos mencionados anteriormente.  Por lo tanto, autorizo y solicito además que el cirujano antes mencionado, los asistentes o las personas designadas realicen los procedimientos que, a herring juicio, mark necesarios y convenientes.    Mi cirujano/médico tony discutido antes de mi cirugía los posibles beneficios, riesgos y efectos secundarios de reji procedimiento, la probabilidad de alcanzar las metas y los posibles problemas que puedan ocurrir vane la recuperación.  Ellos también quiñonez discutido las alternativas razonables al procedimiento, incluso los riesgos, beneficios y efectos secundarios relacionados con las alternativas y los riesgos relacionados con no realizar reji procedimiento.  Quiñonez respondido a todas mis preguntas y confirmo que no se ha dado ninguna garantía en cuanto a los resultados que pueda obtener.    En rani de que surja la necesidad vane mi operación o vane el periodo postoperatorio, también autorizo se aplique ora y/o productos sanguíneos.  Asimismo, entiendo que a pesar de las cuidadosas pruebas y análisis de ora o de los productos sanguíneos que realizan las entidades  recolectoras, todavía puedo estar sujeto a efectos adversos lokesh resultado de recibir meredith transfusión de ora y/o productos sanguíneos.  A continuación se mencionan algunos, aunque no todos, los riesgos potenciales que pueden ocurrir: fiebre y reacciones alérgicas, reacciones hemolíticas, trasmisión de enfermedades lokesh hepatitis, SIDA y citomegalovirus (CMV), así lokesh sobrecarga de líquidos.   En rani de que desee tener meredith transfusión autóloga de mi propia ora o meredith transfusión de un donante dirigido.  Lo discutiré con mi médico.  Check only if Refusing Blood or Blood Products  I understand refusal of blood or blood products as deemed necessary by my physician may have serious consequences to my condition to include possible death. I hereby assume responsibility for my refusal and release the hospital, its personnel, and my physicians from any responsibility for the consequences of my refusal.           o  Refuse       Autorizo el uso de cualquier muestra, órgano, tejido, parte del cuerpo u objeto extraño que pueda ser extraído de mi cuerpo vane la operación/procedimiento para fines de diagnóstico, investigación o de enseñanza y herring desecho posterior por las autoridades del hospital.  También, autorizo la revelación de los resultados de las pruebas de muestras y/o los informes escritos al médico tratante lokesh personal médico del hospital u otros médicos de referencia o consulta involucrados en mi atención, a discreción del patólogo o de mi médico tratante.    Doy consentimiento para que se fotografíen o graben vídeos de las operaciones o procedimientos a realizarse, incluidas las partes de mi cuerpo que mark adecuadas para propósitos médicos, científicos o educativos, en el entendido de que, mi identidad no sea revelada por las fotografías o por textos descriptivos que las acompañen.  Si el procedimiento es fotografiado o grabado en vídeo, el cirujano obtendrá la imagen, cinta de vídeo o CD original.  El  hospital no se hará responsable por el almacenamiento, la revelación o el mantenimiento de la imagen, cinta o CD.    Autorizo la presencia de un especialista de producto u observadores en el quirófano, según lo considere necesario mi médico o las personas que éste designe.     Reconozco que en rani de que mi procedimiento resulte en un tiempo prolongado de radiografía/fluoroscopia, puedo desarrollar meredith reacción en la piel.    Si tengo meredith orden de No intentar la reanimación (CONSTANCE), tala estado se suspenderá mientras esté en el quirófano, la felisha de procedimientos y vane el periodo de recuperación a menos que yo indique lo contrario explícitamente (o meredith persona autorizada a christian el consentimiento en mi nombre). El cirujano o mi médico tratante determinarán cuándo termina el periodo de recuperación aplicable a los efectos de restablecer la orden de CONSTANCE.  Pacientes que se realizan un procedimiento de esterilización: Entiendo que si el procedimiento tiene éxito, los resultados serán permanentes y que, por lo tanto, me será imposible inseminar, concebir o tener hijos.  Asimismo, entiendo que el procedimiento tiene lokesh propósito la esterilidad, aunque el resultado no está garantizado.   Admito que mi médico me ha explicado la aplicación de sedación/analgesia, incluidos los riesgos y beneficios y, consiento a la administración de sedación/analgesia conforme sea necesario o conveniente a juicio de mi médico.      CERTIFICO QUE HE LEÍDO Y COMPRENDIDO EL CONSENTIMIENTO ANTERIOR PARA LA OPERACIÓN y/o PROCEDIMIENTO.             ______________________________________  _______________________________  Firma del paciente      Firma de la persona responsible  Signature of patient      Signature of responsible person                        ___________________________________                                   Nombre en imprenta de la persona responsible         Name of responsible  person          ___________________________________                 Relación con el paciente         Relationship to patient    _________________________________________  ______________ ________________  Firma del testigo          Fecha / Date Hora / Time  Signature of witness    DECLARACÓN DEL MÉDICO Mediante mi firma al calce, afirmo que antes de la hora del procedimiento, he explicadoal paciente y/o a herring representante legal, los riesgos y beneficios involucrados en el tratamiento propuesto, así comocualquier alternativa razonable al tratamiento propuesto. También le(s) he explicado los riesgos y beneficios involucradosen el rechazo del tratarniento propuesto y alternativas al tratamiento propuesto, y he respondido a las preguntas del(la) paciente(My signature below affirms that prior to the time of the procedure, I have explained to the patient and/or his/her guardian, therisks and benefits involved in the proposed treatment and any reasonable alternative to the proposed treatment. I have alsoexplained the risks and benefits involved in refusal of the proposed treatment and have answered the patient's questions.)    ________________________________________   _________________________   _____________   (Firma del médico/Signature of Physician)                                    (Fecha/Date)                                             (Hora/Time)      Patient Name: Eleni Siuutista     : 1980                 Printed: 2024      Medical Record #: [id number]                                              Page 2 of 2